# Patient Record
Sex: MALE | Race: ASIAN | NOT HISPANIC OR LATINO
[De-identification: names, ages, dates, MRNs, and addresses within clinical notes are randomized per-mention and may not be internally consistent; named-entity substitution may affect disease eponyms.]

---

## 2020-02-28 ENCOUNTER — TRANSCRIPTION ENCOUNTER (OUTPATIENT)
Age: 35
End: 2020-02-28

## 2021-03-09 ENCOUNTER — EMERGENCY (EMERGENCY)
Facility: HOSPITAL | Age: 36
LOS: 1 days | Discharge: HOME | End: 2021-03-09
Attending: EMERGENCY MEDICINE | Admitting: EMERGENCY MEDICINE
Payer: MEDICAID

## 2021-03-09 VITALS
TEMPERATURE: 98 F | SYSTOLIC BLOOD PRESSURE: 139 MMHG | HEART RATE: 74 BPM | DIASTOLIC BLOOD PRESSURE: 95 MMHG | RESPIRATION RATE: 16 BRPM | OXYGEN SATURATION: 99 %

## 2021-03-09 PROCEDURE — 71046 X-RAY EXAM CHEST 2 VIEWS: CPT | Mod: 26

## 2021-03-09 PROCEDURE — 93010 ELECTROCARDIOGRAM REPORT: CPT

## 2021-03-09 PROCEDURE — 99284 EMERGENCY DEPT VISIT MOD MDM: CPT | Mod: 25

## 2021-03-09 RX ORDER — LIDOCAINE 4 G/100G
1 CREAM TOPICAL ONCE
Refills: 0 | Status: COMPLETED | OUTPATIENT
Start: 2021-03-09 | End: 2021-03-09

## 2021-03-09 RX ORDER — IBUPROFEN 200 MG
600 TABLET ORAL ONCE
Refills: 0 | Status: COMPLETED | OUTPATIENT
Start: 2021-03-09 | End: 2021-03-09

## 2021-03-09 RX ADMIN — LIDOCAINE 1 PATCH: 4 CREAM TOPICAL at 19:20

## 2021-03-09 RX ADMIN — Medication 600 MILLIGRAM(S): at 19:20

## 2021-03-09 NOTE — ED PROVIDER NOTE - CLINICAL SUMMARY MEDICAL DECISION MAKING FREE TEXT BOX
back/chest/epigastric pain worse with breathing, movement, 6 weeks s/p covid, exam wnl, CXR, ekg, meds, reasses

## 2021-03-09 NOTE — ED PROVIDER NOTE - GASTROINTESTINAL, MLM
Have Your Bumps Been Treated?: not been treated Is This A New Presentation, Or A Follow-Up?: Bump Abdomen soft, non-tender, no guarding.

## 2021-03-09 NOTE — ED PROVIDER NOTE - PATIENT PORTAL LINK FT
You can access the FollowMyHealth Patient Portal offered by [facility] by registering at the following website: [email or web address] By joining [facility]’s FollowMyHealth portal, you will also be able to view your health information using other applications (apps) compatible with our system.

## 2021-03-09 NOTE — ED ADULT TRIAGE NOTE - CHIEF COMPLAINT QUOTE
Pt complaining of lower back pain and abdominal pain x1 week. Denies fever chills, n/v/d, hematuria, pain/burning on urination. Denies pmh.

## 2021-03-09 NOTE — ED PROVIDER NOTE - ATTENDING CONTRIBUTION TO CARE
agree with PA note    " 36 y/o male with no PMHx 6 weeks post COVID presenting with BL lower back pain and epigastric/ lower chest pain x 2 week. Pt describes pain is worse with movement and taking deep breaths. He has not taken any medications for this. Denies any trauma or heavy lifting. Also denies any headache, neck pain, cough, fevers/chills, nausea, vomiting, diarrhea, chest pain, SOB, abdominal pain, urinary symptoms, numbness, tingling, weakness, or leg swelling. No recent travel or sick contacts."  Denies CP/SOB    PE: well appearing; VSS: CTAB/L; s1 s2 no m/r/g abd soft/NT/ND back: no CVA tenderness, no pain over palpation of spine    Imp: likely muscular pain  states some rib pain; not tachycardic; PERC negative (not considering recent COVID infection)  EKG NSR no ST deviation    will get CXR, treat pain and reassess

## 2021-03-09 NOTE — ED ADULT NURSE NOTE - OBJECTIVE STATEMENT
Pt alert times four chief complaint lower back pain and separate epigastric chest pain for 2 weeks. Pt COVID 19 + 6 weeks prior. Denies cough fever chills at this time. denies trauma or falls. Ambulates steady gait. Safety education reinforced with call bell within reach. Patient verbalizes understanding of use. Will continue to monitor.

## 2021-03-09 NOTE — ED PROVIDER NOTE - OBJECTIVE STATEMENT
36 y/o male with no PMHx 6 weeks post COVID presenting with BL lower back pain and epigastric/ lower chest pain x 2 week. Pt describes pain is worse with movement and taking deep breaths. He has not taken any medications for this. Denies any trauma or heavy lifting. Also denies any headache, neck pain, cough, fevers/chills, nausea, vomiting, diarrhea, chest pain, SOB, abdominal pain, urinary symptoms, numbness, tingling, weakness, or leg swelling. No recent travel or sick contacts.

## 2024-02-14 ENCOUNTER — NON-APPOINTMENT (OUTPATIENT)
Age: 39
End: 2024-02-14

## 2024-03-15 PROBLEM — Z00.00 ENCOUNTER FOR PREVENTIVE HEALTH EXAMINATION: Status: ACTIVE | Noted: 2024-03-15

## 2024-03-21 ENCOUNTER — APPOINTMENT (OUTPATIENT)
Dept: OTOLARYNGOLOGY | Facility: CLINIC | Age: 39
End: 2024-03-21